# Patient Record
Sex: MALE | Employment: OTHER | URBAN - METROPOLITAN AREA
[De-identification: names, ages, dates, MRNs, and addresses within clinical notes are randomized per-mention and may not be internally consistent; named-entity substitution may affect disease eponyms.]

---

## 2018-07-24 ENCOUNTER — HOSPITAL ENCOUNTER (OUTPATIENT)
Age: 68
Discharge: HOME | End: 2018-07-26
Payer: MEDICARE

## 2018-07-24 LAB — PROSTATE SPECIFIC ANTIGEN: 24.71 NG/ML (ref 0–4)

## 2018-07-24 PROCEDURE — 84153 ASSAY OF PSA TOTAL: CPT

## 2018-07-24 PROCEDURE — 87088 URINE BACTERIA CULTURE: CPT

## 2018-07-27 LAB — URINE CULTURE, ROUTINE: NORMAL

## 2020-10-20 ENCOUNTER — PREP FOR PROCEDURE (OUTPATIENT)
Dept: UROLOGY | Age: 70
End: 2020-10-20

## 2020-10-20 RX ORDER — SODIUM CHLORIDE 9 MG/ML
INJECTION, SOLUTION INTRAVENOUS CONTINUOUS
Status: CANCELLED | OUTPATIENT
Start: 2020-10-20

## 2020-10-20 RX ORDER — SODIUM CHLORIDE 0.9 % (FLUSH) 0.9 %
10 SYRINGE (ML) INJECTION PRN
Status: CANCELLED | OUTPATIENT
Start: 2020-10-20

## 2020-10-20 RX ORDER — SODIUM CHLORIDE 0.9 % (FLUSH) 0.9 %
10 SYRINGE (ML) INJECTION EVERY 12 HOURS SCHEDULED
Status: CANCELLED | OUTPATIENT
Start: 2020-10-20

## 2020-10-26 RX ORDER — MULTIVIT WITH MINERALS/LUTEIN
250 TABLET ORAL DAILY
COMMUNITY

## 2020-10-26 RX ORDER — TAMSULOSIN HYDROCHLORIDE 0.4 MG/1
0.4 CAPSULE ORAL DAILY
COMMUNITY

## 2020-10-26 RX ORDER — ASCORBIC ACID 500 MG
500 TABLET ORAL DAILY
COMMUNITY

## 2020-10-26 RX ORDER — GLIMEPIRIDE 4 MG/1
4 TABLET ORAL 2 TIMES DAILY
COMMUNITY

## 2020-10-26 RX ORDER — ASPIRIN 81 MG/1
81 TABLET ORAL DAILY
COMMUNITY

## 2020-10-26 RX ORDER — SIMVASTATIN 20 MG
20 TABLET ORAL NIGHTLY
COMMUNITY

## 2020-10-26 RX ORDER — LOSARTAN POTASSIUM 50 MG/1
50 TABLET ORAL DAILY
COMMUNITY

## 2020-10-29 ENCOUNTER — ANESTHESIA EVENT (OUTPATIENT)
Dept: OPERATING ROOM | Age: 70
End: 2020-10-29
Payer: MEDICARE

## 2020-10-30 ENCOUNTER — HOSPITAL ENCOUNTER (OUTPATIENT)
Age: 70
Setting detail: OUTPATIENT SURGERY
Discharge: HOME | End: 2020-10-30
Attending: UROLOGY | Admitting: UROLOGY
Payer: MEDICARE

## 2020-10-30 ENCOUNTER — ANESTHESIA (OUTPATIENT)
Dept: OPERATING ROOM | Age: 70
End: 2020-10-30
Payer: MEDICARE

## 2020-10-30 VITALS — DIASTOLIC BLOOD PRESSURE: 76 MMHG | SYSTOLIC BLOOD PRESSURE: 125 MMHG | OXYGEN SATURATION: 100 %

## 2020-10-30 VITALS
TEMPERATURE: 97.6 F | HEIGHT: 69 IN | RESPIRATION RATE: 18 BRPM | WEIGHT: 210 LBS | HEART RATE: 75 BPM | BODY MASS INDEX: 31.1 KG/M2 | SYSTOLIC BLOOD PRESSURE: 146 MMHG | DIASTOLIC BLOOD PRESSURE: 80 MMHG | OXYGEN SATURATION: 95 %

## 2020-10-30 PROBLEM — R97.20 ELEVATED PSA: Status: ACTIVE | Noted: 2020-10-30

## 2020-10-30 PROBLEM — N40.2 PROSTATE NODULE WITHOUT URINARY OBSTRUCTION: Status: ACTIVE | Noted: 2020-10-30

## 2020-10-30 LAB — METER GLUCOSE: 136 MG/DL (ref 74–99)

## 2020-10-30 PROCEDURE — 6370000000 HC RX 637 (ALT 250 FOR IP): Performed by: UROLOGY

## 2020-10-30 PROCEDURE — 6360000002 HC RX W HCPCS: Performed by: NURSE ANESTHETIST, CERTIFIED REGISTERED

## 2020-10-30 PROCEDURE — 7100000010 HC PHASE II RECOVERY - FIRST 15 MIN: Performed by: UROLOGY

## 2020-10-30 PROCEDURE — 3600000003 HC SURGERY LEVEL 3 BASE: Performed by: UROLOGY

## 2020-10-30 PROCEDURE — 82962 GLUCOSE BLOOD TEST: CPT

## 2020-10-30 PROCEDURE — 3700000000 HC ANESTHESIA ATTENDED CARE: Performed by: UROLOGY

## 2020-10-30 PROCEDURE — 2709999900 HC NON-CHARGEABLE SUPPLY: Performed by: UROLOGY

## 2020-10-30 PROCEDURE — 3600000013 HC SURGERY LEVEL 3 ADDTL 15MIN: Performed by: UROLOGY

## 2020-10-30 PROCEDURE — 2580000003 HC RX 258: Performed by: NURSE PRACTITIONER

## 2020-10-30 PROCEDURE — 7100000011 HC PHASE II RECOVERY - ADDTL 15 MIN: Performed by: UROLOGY

## 2020-10-30 PROCEDURE — 2500000003 HC RX 250 WO HCPCS: Performed by: NURSE ANESTHETIST, CERTIFIED REGISTERED

## 2020-10-30 PROCEDURE — 6360000002 HC RX W HCPCS: Performed by: NURSE PRACTITIONER

## 2020-10-30 PROCEDURE — 3700000001 HC ADD 15 MINUTES (ANESTHESIA): Performed by: UROLOGY

## 2020-10-30 PROCEDURE — 88305 TISSUE EXAM BY PATHOLOGIST: CPT

## 2020-10-30 PROCEDURE — 2500000003 HC RX 250 WO HCPCS: Performed by: UROLOGY

## 2020-10-30 RX ORDER — LIDOCAINE HYDROCHLORIDE 20 MG/ML
INJECTION, SOLUTION EPIDURAL; INFILTRATION; INTRACAUDAL; PERINEURAL PRN
Status: DISCONTINUED | OUTPATIENT
Start: 2020-10-30 | End: 2020-10-30 | Stop reason: SDUPTHER

## 2020-10-30 RX ORDER — BUPIVACAINE HYDROCHLORIDE 5 MG/ML
INJECTION, SOLUTION EPIDURAL; INTRACAUDAL PRN
Status: DISCONTINUED | OUTPATIENT
Start: 2020-10-30 | End: 2020-10-30 | Stop reason: ALTCHOICE

## 2020-10-30 RX ORDER — MIDAZOLAM HYDROCHLORIDE 1 MG/ML
INJECTION INTRAMUSCULAR; INTRAVENOUS PRN
Status: DISCONTINUED | OUTPATIENT
Start: 2020-10-30 | End: 2020-10-30 | Stop reason: SDUPTHER

## 2020-10-30 RX ORDER — SODIUM CHLORIDE 9 MG/ML
INJECTION, SOLUTION INTRAVENOUS CONTINUOUS
Status: DISCONTINUED | OUTPATIENT
Start: 2020-10-30 | End: 2020-10-30 | Stop reason: HOSPADM

## 2020-10-30 RX ORDER — SODIUM CHLORIDE 0.9 % (FLUSH) 0.9 %
10 SYRINGE (ML) INJECTION PRN
Status: DISCONTINUED | OUTPATIENT
Start: 2020-10-30 | End: 2020-10-30 | Stop reason: HOSPADM

## 2020-10-30 RX ORDER — OXYCODONE HYDROCHLORIDE AND ACETAMINOPHEN 5; 325 MG/1; MG/1
1 TABLET ORAL EVERY 6 HOURS PRN
Status: DISCONTINUED | OUTPATIENT
Start: 2020-10-30 | End: 2020-10-30 | Stop reason: HOSPADM

## 2020-10-30 RX ORDER — FENTANYL CITRATE 50 UG/ML
INJECTION, SOLUTION INTRAMUSCULAR; INTRAVENOUS PRN
Status: DISCONTINUED | OUTPATIENT
Start: 2020-10-30 | End: 2020-10-30 | Stop reason: SDUPTHER

## 2020-10-30 RX ORDER — HYDROCODONE BITARTRATE AND ACETAMINOPHEN 5; 325 MG/1; MG/1
2 TABLET ORAL PRN
Status: DISCONTINUED | OUTPATIENT
Start: 2020-10-30 | End: 2020-10-30 | Stop reason: HOSPADM

## 2020-10-30 RX ORDER — SODIUM CHLORIDE 0.9 % (FLUSH) 0.9 %
10 SYRINGE (ML) INJECTION EVERY 12 HOURS SCHEDULED
Status: DISCONTINUED | OUTPATIENT
Start: 2020-10-30 | End: 2020-10-30 | Stop reason: HOSPADM

## 2020-10-30 RX ORDER — ONDANSETRON 2 MG/ML
4 INJECTION INTRAMUSCULAR; INTRAVENOUS EVERY 6 HOURS PRN
Status: DISCONTINUED | OUTPATIENT
Start: 2020-10-30 | End: 2020-10-30 | Stop reason: HOSPADM

## 2020-10-30 RX ORDER — TAMSULOSIN HYDROCHLORIDE 0.4 MG/1
0.4 CAPSULE ORAL DAILY
Status: DISCONTINUED | OUTPATIENT
Start: 2020-10-30 | End: 2020-10-30 | Stop reason: HOSPADM

## 2020-10-30 RX ORDER — PROPOFOL 10 MG/ML
INJECTION, EMULSION INTRAVENOUS PRN
Status: DISCONTINUED | OUTPATIENT
Start: 2020-10-30 | End: 2020-10-30 | Stop reason: SDUPTHER

## 2020-10-30 RX ORDER — HYDROCODONE BITARTRATE AND ACETAMINOPHEN 5; 325 MG/1; MG/1
1 TABLET ORAL PRN
Status: DISCONTINUED | OUTPATIENT
Start: 2020-10-30 | End: 2020-10-30 | Stop reason: HOSPADM

## 2020-10-30 RX ADMIN — MIDAZOLAM 1 MG: 1 INJECTION INTRAMUSCULAR; INTRAVENOUS at 08:30

## 2020-10-30 RX ADMIN — SODIUM CHLORIDE: 9 INJECTION, SOLUTION INTRAVENOUS at 07:00

## 2020-10-30 RX ADMIN — GENTAMICIN SULFATE 120 MG: 40 INJECTION, SOLUTION INTRAMUSCULAR; INTRAVENOUS at 08:00

## 2020-10-30 RX ADMIN — TAMSULOSIN HYDROCHLORIDE 0.4 MG: 0.4 CAPSULE ORAL at 09:29

## 2020-10-30 RX ADMIN — SODIUM CHLORIDE: 9 INJECTION, SOLUTION INTRAVENOUS at 08:00

## 2020-10-30 RX ADMIN — PROPOFOL 20 MG: 10 INJECTION, EMULSION INTRAVENOUS at 08:40

## 2020-10-30 RX ADMIN — LIDOCAINE HYDROCHLORIDE 60 MG: 20 INJECTION, SOLUTION EPIDURAL; INFILTRATION; INTRACAUDAL; PERINEURAL at 08:30

## 2020-10-30 RX ADMIN — PROPOFOL 20 MG: 10 INJECTION, EMULSION INTRAVENOUS at 08:43

## 2020-10-30 RX ADMIN — FENTANYL CITRATE 50 MCG: 50 INJECTION, SOLUTION INTRAMUSCULAR; INTRAVENOUS at 08:30

## 2020-10-30 RX ADMIN — PROPOFOL 80 MG: 10 INJECTION, EMULSION INTRAVENOUS at 08:30

## 2020-10-30 RX ADMIN — PROPOFOL 20 MG: 10 INJECTION, EMULSION INTRAVENOUS at 08:36

## 2020-10-30 ASSESSMENT — PULMONARY FUNCTION TESTS
PIF_VALUE: 0
PIF_VALUE: 1
PIF_VALUE: 0
PIF_VALUE: 1
PIF_VALUE: 0
PIF_VALUE: 1
PIF_VALUE: 0
PIF_VALUE: 1
PIF_VALUE: 1
PIF_VALUE: 0
PIF_VALUE: 1
PIF_VALUE: 0

## 2020-10-30 ASSESSMENT — PAIN DESCRIPTION - PAIN TYPE
TYPE: SURGICAL PAIN

## 2020-10-30 ASSESSMENT — PAIN SCALES - GENERAL
PAINLEVEL_OUTOF10: 0

## 2020-10-30 ASSESSMENT — PAIN - FUNCTIONAL ASSESSMENT: PAIN_FUNCTIONAL_ASSESSMENT: 0-10

## 2020-10-30 NOTE — BRIEF OP NOTE
Brief Postoperative Note      Patient: Jenn Fernández  YOB: 1950  MRN: [id number]    Date of Procedure: 10/30/2020    Pre-Op Diagnosis: ELEVATED PSA/left-sided prostatic nodule    Post-Op Diagnosis: Same       Procedure(s):  TRANSRECTAL ULTRASOUND GUIDED  PROSTATE BIOPSY ([address] NOTIFIED)    Surgeon(s):  Gil Restrepo MD    Assistant:      Anesthesia: Monitor Anesthesia Care/Marcaine point 5 plain 3 cc injected on each side of the prostate at the prostatic seminal vesicle junction    Estimated Blood Loss (mL): Minimal 5 cc    Complications: None    Specimens:   ID Type Source Tests Collected by Time Destination   A : LEFT APEX PROSTATE TISSUE Tissue Tissue SURGICAL PATHOLOGY Gil Restrepo MD 10/30/2020 0840    B : LEFT MIDDLE PROSTATE TISSUE Tissue Tissue SURGICAL PATHOLOGY Gil Restrepo MD 10/30/2020 0840    C : LEFT BASE PROSTATE TISSUE Tissue Tissue SURGICAL PATHOLOGY Gil Restrepo MD 10/30/2020 0840    D : RIGHT  APEX PROSTATE TISSUE Tissue Tissue SURGICAL PATHOLOGY Gil Restrepo MD 10/30/2020 7556    E : RIGHT MIDDLE PROSTATE TISSUE Tissue Tissue SURGICAL PATHOLOGY Gil Restrepo MD 10/30/2020 6636    F : RIGHT BASE PROSTATE TISSUE Tissue Tissue SURGICAL PATHOLOGY Chino Portillo MD 10/30/2020 6804        Implants:  None      Drains: None  Findings: As above    Electronically signed by Chino Portillo MD on 10/30/2020 at 8:53 AM

## 2020-10-30 NOTE — H&P
10/30/2020 8:08 AM  Service: Urology  Group: [facility] ([facility])    Tramaine Marcus  [id number]     Chief Complaint: Elevated PSA and prostatic nodule    History of Present Illness: The patient is a 81 y.o. male patient who presents with history of elevated PSA he had last been seen in 2017 but seen again on August 24, 2020 at that point in time his PSA on 7/29/2020 was 32 he also had a linear area of his prostate that on the left side that was of concern patient deferred any evaluation after a secondary discussion agreed to a sedated ultrasound and biopsy. He has nocturia x0-1 no gross hematuria no dysuria no urgency dribbling post void actually for about 20 years nice its to empty. Patient has had several biopsies in the past referable to his elevated PSA and all of been benign even though his PSA has been over 20.   The firmness in his gland precipitated his need for biopsy again at this time    He understands the risk of biopsy is rectal bleeding hematuria and risk of retention and bacteremia we discussed this with the patient and his wife and they were agreeable to the sedated biopsy at this time    Past Medical History:   Diagnosis Date    Diabetes mellitus ([address]     Hyperlipidemia     Hypertension     PSA elevation     for OR 10-30-20        Past Surgical History:   Procedure Laterality Date    COLONOSCOPY      KNEE ARTHROSCOPY      left 2001     TONSILLECTOMY      TURP      multiple - last 2018       Medications Prior to Admission:    Medications Prior to Admission: glimepiride (AMARYL) 4 MG tablet, Take 4 mg by mouth 2 times daily  metFORMIN (GLUCOPHAGE) 500 MG tablet, Take 500 mg by mouth 2 times daily (with meals)  losartan (COZAAR) 50 MG tablet, Take 50 mg by mouth daily  tamsulosin (FLOMAX) 0.4 MG capsule, Take 0.4 mg by mouth daily  simvastatin (ZOCOR) 20 MG tablet, Take 20 mg by mouth nightly  Ascorbic Acid (VITAMIN C) 250 MG tablet, Take 250 mg by mouth daily  vitamin C (ASCORBIC ACID) 500 MG tablet, Take 500 mg by mouth daily  aspirin 81 MG EC tablet, Take 81 mg by mouth daily    Allergies:    Patient has no known allergies. Social History:    reports that he has never smoked. He has never used smokeless tobacco. He reports current alcohol use. He reports that he does not use drugs. He lives at home with his wife. He is a .  Family History:   Non-contributory to this urological problem  family history is not on file. Review of Systems:  Respiratory: negative for cough and hemoptysis  Cardiovascular: negative for chest pain and dyspnea  Gastrointestinal: negative for abdominal pain, diarrhea, nausea and vomiting  Derm: negative for rash and skin lesion(s)  Neurological: negative for seizures and tremors  Endocrine: negative for diabetic symptoms including polydipsia and polyuria  : As above in the HPI, otherwise negative  All other reviews are negative    Physical Exam:   Vitals: BP (!) 178/92   Pulse 78   Temp 97.1 °F (36.2 °C) (Temporal)   Resp 20   Ht 5' 9\" (1.753 m)   Wt 210 lb (95.3 kg)   SpO2 97%   BMI 31.01 kg/m²   General:  Awake, alert, oriented X 3. Well developed, well nourished, well groomed. No apparent distress. HEENT:  Normocephalic, atraumatic. Pupils equal, round. No scleral icterus. No conjunctival injection. Normal lips, teeth, and gums. No nasal discharge. Neck:  Supple, no masses. Heart:  RRR  Lungs:  No audible wheezing. Respirations symmetric and non-labored.   Abdomen:  soft, nontender, no masses, no organomegaly, no peritoneal signs  Extremities:  No clubbing, cyanosis, or edema  Skin:  Warm and dry, no open lesions or rashes  Neuro: No motor or sensory deficits in the 4 quadrant extremities  Rectal: The left side of the prostate is a firm lateral edge   genitalia: Testicular with is 5 cm he circumcised there is no hydroceles varicoceles or scrotal masses    Labs:   No results for input(s): WBC, RBC, HGB, HCT, MCV, MCH, MCHC, RDW,

## 2020-10-30 NOTE — ANESTHESIA POSTPROCEDURE EVALUATION
Department of Anesthesiology  Postprocedure Note    Patient: Nate Parra  MRN: [id number]  YOB: 1950  Date of evaluation: 10/30/2020  Time:  9:22 AM     Procedure Summary     Date:  10/30/20 Room / Location:  [facility] 06 / 106 [facility]    Anesthesia Start:  2474 Anesthesia Stop:  3914    Procedure:  TRANSRECTAL ULTRASOUND GUIDED  PROSTATE BIOPSY ([address] NOTIFIED) (N/A ) Diagnosis:  (ELEVATED PSA)    Surgeon:  Lucien Suresh MD Responsible Provider:  Chip Christopher MD    Anesthesia Type:  MAC ASA Status:  3          Anesthesia Type: MAC    Phillip Phase I: Phillip Score: 9    Phillip Phase II: Phillip Score: 10    Last vitals: Reviewed and per EMR flowsheets.        Anesthesia Post Evaluation    Patient location during evaluation: PACU  Patient participation: complete - patient participated  Level of consciousness: awake and alert  Airway patency: patent  Nausea & Vomiting: no nausea and no vomiting  Complications: no  Cardiovascular status: hemodynamically stable  Respiratory status: acceptable  Hydration status: euvolemic

## 2020-10-30 NOTE — ANESTHESIA PRE PROCEDURE
Department of Anesthesiology  Preprocedure Note       Name:  Radha Larry   Age:  81 y.o.  :  1950                                          MRN:  [id number]         Date:  10/30/2020      Surgeon: Jeffy Griffin):  Cristi Restrepo MD    Procedure: Procedure(s):  TRANSRECTAL ULTRASOUND GUIDED  PROSTATE BIOPSY ([address] NOTIFIED)    Medications prior to admission:   Prior to Admission medications    Medication Sig Start Date End Date Taking?  Authorizing Provider   glimepiride (AMARYL) 4 MG tablet Take 4 mg by mouth 2 times daily   Yes Historical Provider, MD   metFORMIN (GLUCOPHAGE) 500 MG tablet Take 500 mg by mouth 2 times daily (with meals)   Yes Historical Provider, MD   losartan (COZAAR) 50 MG tablet Take 50 mg by mouth daily   Yes Historical Provider, MD   tamsulosin (FLOMAX) 0.4 MG capsule Take 0.4 mg by mouth daily   Yes Historical Provider, MD   simvastatin (ZOCOR) 20 MG tablet Take 20 mg by mouth nightly   Yes Historical Provider, MD   Ascorbic Acid (VITAMIN C) 250 MG tablet Take 250 mg by mouth daily   Yes Historical Provider, MD   vitamin C (ASCORBIC ACID) 500 MG tablet Take 500 mg by mouth daily   Yes Historical Provider, MD   aspirin 81 MG EC tablet Take 81 mg by mouth daily   Yes Historical Provider, MD       Current medications:    Current Facility-Administered Medications   Medication Dose Route Frequency Provider Last Rate Last Dose    0.9 % sodium chloride infusion   Intravenous Continuous EMMIE Rogers CNP        sodium chloride flush 0.9 % injection 10 mL  10 mL Intravenous 2 times per day EMMIE Rogers CNP        sodium chloride flush 0.9 % injection 10 mL  10 mL Intravenous PRN EMMIE Rogers CNP        gentamicin (GARAMYCIN) 120 mg in dextrose 5 % 250 mL IVPB  120 mg Intravenous On Call to [address], APRN - CNP        HYDROcodone-acetaminophen (NORCO) 5-325 MG per tablet 1 tablet  1 tablet Oral PRN Yayo Garland MD        Or   Viviane Amador HYDROcodone-acetaminophen (NORCO) 5-325 MG per tablet 2 tablet  2 tablet Oral PRN Fausto Moscoso MD           Allergies:  No Known Allergies    Problem List:  There is no problem list on file for this patient. Past Medical History:        Diagnosis Date    Diabetes mellitus ([address]     Hyperlipidemia     Hypertension     PSA elevation     for OR 10-30-20        Past Surgical History:        Procedure Laterality Date    COLONOSCOPY      KNEE ARTHROSCOPY      left 2001     TONSILLECTOMY      TURP      multiple - last 2018       Social History:    Social History     Tobacco Use    Smoking status: Never Smoker    Smokeless tobacco: Never Used   Substance Use Topics    Alcohol use: Yes     Comment: rare                                 Counseling given: Not Answered      Vital Signs (Current):   Vitals:    10/26/20 1008 10/30/20 0651   BP:  (!) 178/92   Pulse:  78   Resp:  20   Temp:  97.1 °F (36.2 °C)   TempSrc:  Temporal   SpO2:  97%   Weight: 210 lb (95.3 kg) 210 lb (95.3 kg)   Height: 5' 9\" (1.753 m) 5' 9\" (1.753 m)                                              BP Readings from Last 3 Encounters:   10/30/20 (!) 178/92       NPO Status: Time of last liquid consumption: 2200                        Time of last solid consumption: 2200                        Date of last liquid consumption: 10/29/20                        Date of last solid food consumption: 10/29/20    BMI:   Wt Readings from Last 3 Encounters:   10/30/20 210 lb (95.3 kg)     Body mass index is 31.01 kg/m². CBC: No results found for: WBC, RBC, HGB, HCT, MCV, RDW, PLT    CMP: No results found for: NA, K, CL, CO2, BUN, CREATININE, GFRAA, AGRATIO, LABGLOM, GLUCOSE, PROT, CALCIUM, BILITOT, ALKPHOS, AST, ALT    POC Tests: No results for input(s): POCGLU, POCNA, POCK, POCCL, POCBUN, POCHEMO, POCHCT in the last 72 hours.     Coags: No results found for: PROTIME, INR, APTT    HCG (If Applicable): No results found for: PREGTESTUR, PREGSERUM, HCG, HCGQUANT     ABGs: No results found for: PHART, PO2ART, YKM7XRZ, BMF4EXB, BEART, B3HPULFS     Type & Screen (If Applicable):  No results found for: LABABO, LABRH    Drug/Infectious Status (If Applicable):  No results found for: HIV, HEPCAB    COVID-19 Screening (If Applicable): No results found for: COVID19      Anesthesia Evaluation  Patient summary reviewed no history of anesthetic complications:   Airway: Mallampati: II  TM distance: >3 FB   Neck ROM: full   Dental:          Pulmonary:Negative Pulmonary ROS breath sounds clear to auscultation                             Cardiovascular:    (+) hypertension:, hyperlipidemia        Rhythm: regular  Rate: normal           Beta Blocker:  Not on Beta Blocker         Neuro/Psych:   Negative Neuro/Psych ROS              GI/Hepatic/Renal:            ROS comment:  PSA elevation  . Endo/Other:    (+) DiabetesType II DM, , .                  ROS comment: obese Abdominal:           Vascular: negative vascular ROS. Anesthesia Plan      MAC     ASA 3       Induction: intravenous. Anesthetic plan and risks discussed with patient. Plan discussed with CRNA.                   Nahed Cash MD   10/30/2020

## 2020-10-30 NOTE — PROGRESS NOTES
7447 nourishment provided  09 discharge instructions reviewed;sedation instructions provided
Reviewed pt's medical history with pt. Per COVID testing guidelines, pt does not require COVID test prior to procedure.
apple juice only    [] Bring inhalers day of surgery    [] Bring C-PAP/ Bi-Pap day of surgery    [] Bring urine specimen day of surgery    [x] Shower or bath with soap, lather and rinse well, AM of Surgery, no lotion, powders or creams to surgical site    [] Follow bowel prep as instructed per surgeon    [x] No tobacco products within 24 hours of surgery     [x] No alcohol or illegal drug use within 24 hours of surgery.     [x] Jewelry, body piercing's, eyeglasses, contact lenses and dentures are not permitted into surgery (bring cases)      [] Please do not wear any nail polish, make up or hair products on the day of surgery    [x] You can expect a call the business day prior to procedure to notify you if your arrival time changes    [x] If you receive a survey after surgery we would greatly appreciate your comments    [] Parent/guardian of a minor must accompany their child and remain on the premises  the entire time they are under our care     [] Pediatric patients may bring favorite toy, blanket or comfort item with them    [] A caregiver or family member must remain with the patient during their stay if they are mentally handicapped, have dementia, disoriented or unable to use a call light or would be a safety concern if left unattended    [x] Please notify surgeon if you develop any illness between now and time of surgery (cold, cough, sore throat, fever, nausea, vomiting) or any signs of infections  including skin, wounds, and dental.    [x]  The [facility] is available to fill your prescription here on your day of surgery, ask your preop nurse for details    [x] Other instructions    EDUCATIONAL MATERIALS PROVIDED:    [] PAT Preoperative Education Packet/Booklet     [] Medication List    [] Transfusion bracelet applied with instructions    [] Shower with soap, lather and rinse well, and use CHG wipes provided the evening before surgery as instructed    [] Incentive spirometer with instructions

## 2020-10-30 NOTE — INTERVAL H&P NOTE
Update History & Physical    The patient's H&P from today was reviewed with the patient and I examined the patient. There was no change in the plan. The surgical site was confirmed by the patient and me. Plan: The risks, benefits, expected outcome, and alternative to the recommended procedure have been discussed with the patient. Patient understands and wants to proceed with the procedure.      Electronically signed by Souleymane Adame MD on 10/30/2020 at 8:52 AM

## 2020-10-31 NOTE — OP NOTE
[address] [address]                                OPERATIVE REPORT    PATIENT NAME: Jose David Ho                     :        1950  MED REC NO:   [id number]                            ROOM:  ACCOUNT NO:   [de-identified]                           ADMIT DATE: 10/30/2020  PROVIDER:     Alicia Cintron MD    DATE OF PROCEDURE:  10/30/2020    PREOPERATIVE DIAGNOSES:  Elevated PSA and prostatic nodule, left. POSTOPERATIVE DIAGNOSES:  Elevated PSA and prostatic nodule, left. OPERATION PERFORMED:  Transrectal ultrasound-guided biopsies and  transrectal ultrasound of the prostate. ANESTHESIA:  Monitored sedation and Marcaine 0.5 plain was injected 3 mL  on each side at the seminal vesicoprostatic junction. BLOOD LOSS:  Minimal, less than 5 mL. CONDITION:  Stable. DISPOSITION:  PACU, then home. SURGEON:  Alicia Cintron MD.    DESCRIPTION OF PROCEDURE:  The time-out was read by me, the Anesthesia,  and the operating staff. We reviewed history and physical, allergy, and  medication. We all were in agreement. The patient had received  additional gentamicin in the preop area, 120 mg and then was placed in  the left lateral position. Rectal:  Good anal tone. No hemorrhoids, no  masses, no impaction. The prostate did show firmness on the left side  of the prostate as noted in the office. With the BK ultrasound at 9  MHz, prostatic volume was measured at 41 mL. There were no  abnormalities of the rectal wall, the seminal vesicles, or the bladder. 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 The patient then had hypoechoic area  identified on the left side compatible with the firmness. It was linear  and approximately 2 cm long x 6 mm.   The patient had four core biopsies  done of this area and this would be considered the left base with

## 2020-11-22 ENCOUNTER — HOSPITAL ENCOUNTER (OUTPATIENT)
Dept: HOSPITAL 83 - COVID19 | Age: 70
Discharge: HOME | End: 2020-11-22
Attending: STUDENT IN AN ORGANIZED HEALTH CARE EDUCATION/TRAINING PROGRAM
Payer: MEDICARE

## 2020-11-22 DIAGNOSIS — U07.1: Primary | ICD-10-CM

## 2022-11-23 ENCOUNTER — HOSPITAL ENCOUNTER (OUTPATIENT)
Dept: HOSPITAL 83 - RESCLI | Age: 72
Discharge: HOME | End: 2022-11-23
Attending: STUDENT IN AN ORGANIZED HEALTH CARE EDUCATION/TRAINING PROGRAM
Payer: MEDICARE

## 2022-11-23 DIAGNOSIS — I10: ICD-10-CM

## 2022-11-23 DIAGNOSIS — N40.0: ICD-10-CM

## 2022-11-23 DIAGNOSIS — Z79.899: ICD-10-CM

## 2022-11-23 DIAGNOSIS — F41.9: ICD-10-CM

## 2022-11-23 DIAGNOSIS — E78.2: ICD-10-CM

## 2022-11-23 DIAGNOSIS — E11.9: Primary | ICD-10-CM

## 2023-06-20 ENCOUNTER — HOSPITAL ENCOUNTER (OUTPATIENT)
Dept: HOSPITAL 83 - RESCLI | Age: 73
Discharge: HOME | End: 2023-06-20
Attending: STUDENT IN AN ORGANIZED HEALTH CARE EDUCATION/TRAINING PROGRAM
Payer: MEDICARE

## 2023-06-20 DIAGNOSIS — F41.9: ICD-10-CM

## 2023-06-20 DIAGNOSIS — Z88.8: ICD-10-CM

## 2023-06-20 DIAGNOSIS — I10: ICD-10-CM

## 2023-06-20 DIAGNOSIS — E78.2: ICD-10-CM

## 2023-06-20 DIAGNOSIS — N40.0: Primary | ICD-10-CM

## 2023-06-20 DIAGNOSIS — E11.9: ICD-10-CM

## 2023-06-20 DIAGNOSIS — Z79.899: ICD-10-CM

## 2023-06-20 DIAGNOSIS — Z98.890: ICD-10-CM

## 2024-06-20 ENCOUNTER — HOSPITAL ENCOUNTER (OUTPATIENT)
Dept: HOSPITAL 83 - RESCLI | Age: 74
Discharge: HOME | End: 2024-06-20
Attending: STUDENT IN AN ORGANIZED HEALTH CARE EDUCATION/TRAINING PROGRAM
Payer: MEDICARE

## 2024-06-20 DIAGNOSIS — Z98.890: ICD-10-CM

## 2024-06-20 DIAGNOSIS — F41.9: ICD-10-CM

## 2024-06-20 DIAGNOSIS — Z82.49: ICD-10-CM

## 2024-06-20 DIAGNOSIS — E11.9: ICD-10-CM

## 2024-06-20 DIAGNOSIS — N40.0: Primary | ICD-10-CM

## 2024-06-20 DIAGNOSIS — E78.5: ICD-10-CM

## 2024-06-20 DIAGNOSIS — Z82.3: ICD-10-CM

## 2024-06-20 DIAGNOSIS — E78.2: ICD-10-CM

## 2024-06-20 DIAGNOSIS — I10: ICD-10-CM

## 2024-06-20 DIAGNOSIS — Z79.899: ICD-10-CM

## 2024-06-20 DIAGNOSIS — Z83.3: ICD-10-CM

## (undated) DEVICE — NEEDLE SPNL 22GA L7IN BLK HUB S STL W/ QNCKE PNT W/OUT

## (undated) DEVICE — 1071 S-DRP URO STLE-GAMA 10/BX,4X/C: Brand: STERI-DRAPE™

## (undated) DEVICE — MARKER,SKIN,WI/RULER AND LABELS: Brand: MEDLINE

## (undated) DEVICE — NEEDLE BX 14GA LAIN 20MM SPEC NOTCH SCALP SHRP SURG STL CUT

## (undated) DEVICE — SPECIMEN CUP W/LID: Brand: DEROYAL

## (undated) DEVICE — Device

## (undated) DEVICE — PAD,NON-ADHERENT,2X3,STERILE,LF,1/PK: Brand: MEDLINE

## (undated) DEVICE — SHEET,DRAPE,40X58,STERILE: Brand: MEDLINE

## (undated) DEVICE — SYRINGE, LUER LOCK, 10ML: Brand: MEDLINE

## (undated) DEVICE — GLOVE ORANGE PI 7 1/2   MSG9075

## (undated) DEVICE — SET SURG BASIN MAYO REUSABLE

## (undated) DEVICE — MAX-CORE® DISPOSABLE CORE BIOPSY INSTRUMENT, 18G X 25CM: Brand: MAX-CORE

## (undated) DEVICE — DRAPE,REIN 53X77,STERILE: Brand: MEDLINE